# Patient Record
Sex: MALE | Race: WHITE | NOT HISPANIC OR LATINO | URBAN - METROPOLITAN AREA
[De-identification: names, ages, dates, MRNs, and addresses within clinical notes are randomized per-mention and may not be internally consistent; named-entity substitution may affect disease eponyms.]

---

## 2017-12-21 ENCOUNTER — EMERGENCY (EMERGENCY)
Facility: HOSPITAL | Age: 27
LOS: 1 days | End: 2017-12-21
Payer: COMMERCIAL

## 2017-12-21 PROCEDURE — 99283 EMERGENCY DEPT VISIT LOW MDM: CPT | Mod: 25

## 2017-12-26 PROBLEM — Z00.00 ENCOUNTER FOR PREVENTIVE HEALTH EXAMINATION: Status: ACTIVE | Noted: 2017-12-26

## 2017-12-29 ENCOUNTER — APPOINTMENT (OUTPATIENT)
Dept: NEUROSURGERY | Facility: CLINIC | Age: 27
End: 2017-12-29
Payer: COMMERCIAL

## 2017-12-29 VITALS
DIASTOLIC BLOOD PRESSURE: 76 MMHG | SYSTOLIC BLOOD PRESSURE: 128 MMHG | HEIGHT: 74 IN | WEIGHT: 210 LBS | HEART RATE: 76 BPM | BODY MASS INDEX: 26.95 KG/M2 | OXYGEN SATURATION: 98 %

## 2017-12-29 PROCEDURE — 99204 OFFICE O/P NEW MOD 45 MIN: CPT

## 2019-04-23 ENCOUNTER — APPOINTMENT (OUTPATIENT)
Dept: CARDIOLOGY | Facility: CLINIC | Age: 29
End: 2019-04-23
Payer: COMMERCIAL

## 2019-04-23 ENCOUNTER — RECORD ABSTRACTING (OUTPATIENT)
Age: 29
End: 2019-04-23

## 2019-04-23 VITALS
SYSTOLIC BLOOD PRESSURE: 120 MMHG | BODY MASS INDEX: 29.26 KG/M2 | HEART RATE: 70 BPM | OXYGEN SATURATION: 97 % | DIASTOLIC BLOOD PRESSURE: 76 MMHG | WEIGHT: 228 LBS | HEIGHT: 74 IN

## 2019-04-23 DIAGNOSIS — M25.9 JOINT DISORDER, UNSPECIFIED: ICD-10-CM

## 2019-04-23 DIAGNOSIS — Z78.9 OTHER SPECIFIED HEALTH STATUS: ICD-10-CM

## 2019-04-23 DIAGNOSIS — R07.9 CHEST PAIN, UNSPECIFIED: ICD-10-CM

## 2019-04-23 DIAGNOSIS — Z87.891 PERSONAL HISTORY OF NICOTINE DEPENDENCE: ICD-10-CM

## 2019-04-23 DIAGNOSIS — Z91.89 OTHER SPECIFIED PERSONAL RISK FACTORS, NOT ELSEWHERE CLASSIFIED: ICD-10-CM

## 2019-04-23 PROCEDURE — 99203 OFFICE O/P NEW LOW 30 MIN: CPT | Mod: 25

## 2019-04-23 RX ORDER — IBUPROFEN 800 MG/1
800 TABLET, FILM COATED ORAL EVERY 6 HOURS
Refills: 0 | Status: ACTIVE | COMMUNITY

## 2019-04-23 NOTE — DISCUSSION/SUMMARY
[FreeTextEntry1] : 1. Chest Pain: based on history and physical examination, patient suffering from what appears to be costochondritis. Recommend he continue NSAIDs with heat. Ordered chest X-Ray. His ECG is normal and cardiac physical examination is benign. I don't believe there to be any cardiac pathology. I would recommend patient follow up with his PCP and/or chiropractor.

## 2019-04-23 NOTE — HISTORY OF PRESENT ILLNESS
[FreeTextEntry1] : This is a 29 year old male with no PMHx presents with a three week history of chest pain. It is located on the left side of his chest and doesn't radiate anywhere. The patient describes it as a soreness and sharp pain moderate to severe in severity. Lying flat and rolling over in bed makes it worse as well as moving his arms, picking up his baby and even putting on his shirt. Not moving makes it better but a low level of discomfort is always there. No associated SOB or palpitations. Patient can't pinpoint an injury to the area. He was working out up until the chest pain started with no exertional complaints, however he never lifted heavy.

## 2019-04-23 NOTE — PHYSICAL EXAM
[General Appearance - Well Developed] : well developed [Normal Appearance] : normal appearance [General Appearance - Well Nourished] : well nourished [Well Groomed] : well groomed [Normal Conjunctiva] : the conjunctiva exhibited no abnormalities [Eyelids - No Xanthelasma] : the eyelids demonstrated no xanthelasmas [General Appearance - In No Acute Distress] : no acute distress [Normal Oral Mucosa] : normal oral mucosa [No Oral Pallor] : no oral pallor [No Oral Cyanosis] : no oral cyanosis [Heart Rate And Rhythm] : heart rate and rhythm were normal [Heart Sounds] : normal S1 and S2 [Murmurs] : no murmurs present [Arterial Pulses Normal] : the arterial pulses were normal [Edema] : no peripheral edema present [Respiration, Rhythm And Depth] : normal respiratory rhythm and effort [Exaggerated Use Of Accessory Muscles For Inspiration] : no accessory muscle use [Auscultation Breath Sounds / Voice Sounds] : lungs were clear to auscultation bilaterally [Abnormal Walk] : normal gait [Gait - Sufficient For Exercise Testing] : the gait was sufficient for exercise testing [Nail Clubbing] : no clubbing of the fingernails [Cyanosis, Localized] : no localized cyanosis [Skin Turgor] : normal skin turgor [] : no rash [FreeTextEntry1] : no bruising of the chest wall

## 2019-04-23 NOTE — REVIEW OF SYSTEMS
[Shortness Of Breath] : no shortness of breath [see HPI] : see HPI [Chest Pain] : chest pain [Dyspnea on exertion] : not dyspnea during exertion [Lower Ext Edema] : no extremity edema [Negative] : Heme/Lymph

## 2019-05-01 ENCOUNTER — APPOINTMENT (OUTPATIENT)
Dept: RADIOLOGY | Facility: CLINIC | Age: 29
End: 2019-05-01
Payer: COMMERCIAL

## 2019-05-01 PROCEDURE — 71100 X-RAY EXAM RIBS UNI 2 VIEWS: CPT | Mod: LT

## 2019-10-03 ENCOUNTER — APPOINTMENT (OUTPATIENT)
Dept: UROLOGY | Facility: CLINIC | Age: 29
End: 2019-10-03
Payer: OTHER GOVERNMENT

## 2019-10-03 VITALS
HEIGHT: 74 IN | HEART RATE: 65 BPM | TEMPERATURE: 98 F | WEIGHT: 230 LBS | BODY MASS INDEX: 29.52 KG/M2 | SYSTOLIC BLOOD PRESSURE: 114 MMHG | DIASTOLIC BLOOD PRESSURE: 75 MMHG

## 2019-10-03 DIAGNOSIS — N32.81 OVERACTIVE BLADDER: ICD-10-CM

## 2019-10-03 LAB
BILIRUB UR QL STRIP: NEGATIVE
CLARITY UR: CLEAR
COLLECTION METHOD: NORMAL
GLUCOSE UR-MCNC: NEGATIVE
HCG UR QL: 0.2 EU/DL
HGB UR QL STRIP.AUTO: NEGATIVE
KETONES UR-MCNC: NEGATIVE
LEUKOCYTE ESTERASE UR QL STRIP: NEGATIVE
NITRITE UR QL STRIP: NEGATIVE
PH UR STRIP: 6
PROT UR STRIP-MCNC: NEGATIVE
SP GR UR STRIP: 1.01

## 2019-10-03 PROCEDURE — 81003 URINALYSIS AUTO W/O SCOPE: CPT | Mod: QW

## 2019-10-03 PROCEDURE — 99202 OFFICE O/P NEW SF 15 MIN: CPT | Mod: 25

## 2019-10-03 NOTE — HISTORY OF PRESENT ILLNESS
[Urinary Frequency] : urinary frequency [FreeTextEntry1] : Mr. RAMILA BUNDY 29 year old  M  PMH L4, L5 back injury and no PSH. Pt comes in bc he feels he is urinating a lot throughout the day. Urinates about every 20 minutes. Sometimes feels he doesn't totally empty. Urinary stream is good. Denies dysuria and hematuria. Symptoms have been going on for over a year. Denies [facility] of prostate issues of cancer in his family.

## 2019-10-03 NOTE — REVIEW OF SYSTEMS
[Wake up at night to urinate  How many times?  ___] : wakes up to urinate [unfilled] times during the night [Negative] : Heme/Lymph

## 2019-10-03 NOTE — ASSESSMENT
[FreeTextEntry1] : Mr. RAMILA BUNDY 29 year old  M  PMH L4, L5 back injury and no PSH. Pt comes in bc he feels he is urinating a lot throughout the day. Urinates about every 20 minutes. Sometimes feels he doesn't totally empty. Urinary stream is good. Denies dysuria and hematuria. Symptoms have been going on for over a year. Denies [facility] of prostate issues of cancer in his family. \par \par Pt feels that his symptoms are not worse enough to try medication\par \par Plan\par fu as needed

## 2020-07-24 ENCOUNTER — TRANSCRIPTION ENCOUNTER (OUTPATIENT)
Age: 30
End: 2020-07-24

## 2020-07-24 ENCOUNTER — APPOINTMENT (OUTPATIENT)
Dept: MRI IMAGING | Facility: CLINIC | Age: 30
End: 2020-07-24
Payer: OTHER GOVERNMENT

## 2020-07-24 PROCEDURE — 72148 MRI LUMBAR SPINE W/O DYE: CPT

## 2020-07-28 ENCOUNTER — TRANSCRIPTION ENCOUNTER (OUTPATIENT)
Age: 30
End: 2020-07-28

## 2020-07-29 ENCOUNTER — APPOINTMENT (OUTPATIENT)
Dept: NEUROSURGERY | Facility: CLINIC | Age: 30
End: 2020-07-29
Payer: OTHER GOVERNMENT

## 2020-07-29 VITALS
HEART RATE: 76 BPM | HEIGHT: 74 IN | WEIGHT: 214 LBS | BODY MASS INDEX: 27.46 KG/M2 | DIASTOLIC BLOOD PRESSURE: 82 MMHG | SYSTOLIC BLOOD PRESSURE: 129 MMHG

## 2020-07-29 DIAGNOSIS — M53.3 SACROCOCCYGEAL DISORDERS, NOT ELSEWHERE CLASSIFIED: ICD-10-CM

## 2020-07-29 DIAGNOSIS — M51.9 UNSPECIFIED THORACIC, THORACOLUMBAR AND LUMBOSACRAL INTERVERTEBRAL DISC DISORDER: ICD-10-CM

## 2020-07-29 DIAGNOSIS — M71.38 OTHER BURSAL CYST, OTHER SITE: ICD-10-CM

## 2020-07-29 PROCEDURE — 99215 OFFICE O/P EST HI 40 MIN: CPT

## 2020-07-29 RX ORDER — METHOCARBAMOL 500 MG/1
500 TABLET, FILM COATED ORAL
Qty: 30 | Refills: 0 | Status: ACTIVE | COMMUNITY
Start: 2020-07-29 | End: 1900-01-01

## 2020-07-29 RX ORDER — CELECOXIB 200 MG/1
200 CAPSULE ORAL TWICE DAILY
Qty: 30 | Refills: 1 | Status: ACTIVE | COMMUNITY
Start: 2020-07-29 | End: 1900-01-01

## 2020-07-29 RX ORDER — TRAMADOL HYDROCHLORIDE 50 MG/1
50 TABLET, COATED ORAL
Qty: 30 | Refills: 0 | Status: ACTIVE | COMMUNITY
Start: 2020-07-29 | End: 1900-01-01

## 2020-07-29 NOTE — HISTORY OF PRESENT ILLNESS
[de-identified] : \matteo Laboy is here in the office for followup evaluation. His last in the office to see me in 2017.  His rather severe back pain. He has degenerative disc disease noted at his previous evaluation and consideration is that he had some degree of facet arthropathy maybe sacroiliac joint dysfunction. His pain is worsened over the past 3 years. He has been through nonsurgical methods such as physical therapy and injections. He had a new MRI performed next or 2 [facility] for our review today. His index injury is related to his service in the "[employer]  He is currently in the reserves. He states that at one point during the [employer] activation he had excruciating pain was unable to get out of bed and had to soak in a hot tub before being able to ambulate. This was in October of 2019. Since that time he's been using simple medications and other nonsurgical methods to manage his pain. He's not had any significant procedures to the spine. He currently works as an  at [employer].

## 2020-07-29 NOTE — PLAN
[FreeTextEntry1] : DIAGNOSIS:  DISK DEGENERATION  \par TREATMENT PLAN:  NON-SURGICAL\par \par This is a patient with a degenerated lumbar disk.  I have recommended nonsurgical management at this time.  This consists of physical therapy and/or manual medicine in conjunction to medical therapy and other conservative methods.  These include the consideration of trigger point injections and or the utilization of modalities such as TENS where applicable.  The next tier would be the referral to a pain management specialist (anesthesia or physiatry) for the consideration of spinal injections.  This includes the options of epidural steroids, facet injections as well as other novel techniques that may provide pain relief.  Although there is indeed evidence of disk degeneration on imaging, discectomy or spinal fusion for the sole purposes of treating back/leg pain in the absence of a compressive lesion or neurological issue is associated with poor outcomes.   \par \par I have reviewed the images in detail with the patient today in my office and have answered all questions regarding this condition to the best of my ability to the patient’s satisfaction.\par \par We'll get a new CAT scan to determine whether or not the facets had changes in them that the MRI cannot detect or if there is any evidence of a microfracture through the pars at the x-ray does not detect. I referred him for pain management physical therapy and will followup with him accordingly.

## 2020-08-11 ENCOUNTER — APPOINTMENT (OUTPATIENT)
Dept: CT IMAGING | Facility: CLINIC | Age: 30
End: 2020-08-11
Payer: OTHER GOVERNMENT

## 2020-08-11 PROCEDURE — 72131 CT LUMBAR SPINE W/O DYE: CPT

## 2021-01-22 ENCOUNTER — APPOINTMENT (OUTPATIENT)
Dept: DISASTER EMERGENCY | Facility: CLINIC | Age: 31
End: 2021-01-22

## 2021-01-22 DIAGNOSIS — Z01.818 ENCOUNTER FOR OTHER PREPROCEDURAL EXAMINATION: ICD-10-CM

## 2021-01-25 LAB — SARS-COV-2 N GENE NPH QL NAA+PROBE: NOT DETECTED

## 2021-06-23 ENCOUNTER — EMERGENCY (EMERGENCY)
Facility: HOSPITAL | Age: 31
LOS: 1 days | End: 2021-06-23
Admitting: EMERGENCY MEDICINE
Payer: COMMERCIAL

## 2021-06-23 PROCEDURE — 72148 MRI LUMBAR SPINE W/O DYE: CPT | Mod: 26

## 2021-06-23 PROCEDURE — 99284 EMERGENCY DEPT VISIT MOD MDM: CPT

## 2021-07-19 ENCOUNTER — OUTPATIENT (OUTPATIENT)
Dept: OUTPATIENT SERVICES | Facility: HOSPITAL | Age: 31
LOS: 1 days | Discharge: HOME | End: 2021-07-19

## 2021-10-13 ENCOUNTER — OUTPATIENT (OUTPATIENT)
Dept: OUTPATIENT SERVICES | Facility: HOSPITAL | Age: 31
LOS: 1 days | Discharge: HOME | End: 2021-10-13